# Patient Record
Sex: MALE | Race: WHITE | NOT HISPANIC OR LATINO | URBAN - METROPOLITAN AREA
[De-identification: names, ages, dates, MRNs, and addresses within clinical notes are randomized per-mention and may not be internally consistent; named-entity substitution may affect disease eponyms.]

---

## 2018-04-16 ENCOUNTER — OFFICE VISIT - HEALTHEAST (OUTPATIENT)
Dept: FAMILY MEDICINE | Facility: CLINIC | Age: 18
End: 2018-04-16

## 2018-04-16 DIAGNOSIS — J01.90 ACUTE SINUSITIS: ICD-10-CM

## 2018-04-16 DIAGNOSIS — J02.9 SORE THROAT: ICD-10-CM

## 2018-04-16 RX ORDER — LORATADINE 10 MG/1
10 TABLET ORAL DAILY
Qty: 30 TABLET | Refills: 2 | Status: SHIPPED | OUTPATIENT
Start: 2018-04-16

## 2018-04-16 RX ORDER — OXYMETAZOLINE HYDROCHLORIDE 0.05 G/100ML
SPRAY NASAL
Refills: 0 | Status: SHIPPED | COMMUNITY
Start: 2018-04-16

## 2018-04-16 RX ORDER — PSEUDOEPHEDRINE HCL 30 MG
30 TABLET ORAL EVERY 6 HOURS PRN
Qty: 24 TABLET | Refills: 2 | Status: SHIPPED | OUTPATIENT
Start: 2018-04-16

## 2019-10-17 ENCOUNTER — OFFICE VISIT - HEALTHEAST (OUTPATIENT)
Dept: FAMILY MEDICINE | Facility: CLINIC | Age: 19
End: 2019-10-17

## 2019-10-17 DIAGNOSIS — H65.93 FLUID LEVEL BEHIND TYMPANIC MEMBRANE OF BOTH EARS: ICD-10-CM

## 2019-10-17 ASSESSMENT — MIFFLIN-ST. JEOR: SCORE: 1823.96

## 2021-06-01 VITALS — WEIGHT: 181.5 LBS | BODY MASS INDEX: 26.61 KG/M2
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
## 2021-06-03 VITALS
OXYGEN SATURATION: 98 % | WEIGHT: 184 LBS | HEART RATE: 53 BPM | BODY MASS INDEX: 27.25 KG/M2 | DIASTOLIC BLOOD PRESSURE: 71 MMHG | HEIGHT: 69 IN | SYSTOLIC BLOOD PRESSURE: 105 MMHG

## 2021-06-17 NOTE — PROGRESS NOTES
Assessment:     1. Acute sinusitis  sodium chloride 0.65 % Drop    oxymetazoline (AFRIN, OXYMETAZOLINE,) 0.05 % nasal spray    fluticasone (FLONASE) 50 mcg/actuation nasal spray    loratadine (CLARITIN) 10 mg tablet    pseudoephedrine (SUDAFED) 30 MG tablet        Acute non-bacterial sinusitis.      Plan:      Nasal saline sprays.  Nasal steroids per medication orders.  Antihistamines per medication orders.  Follow up in a few days or as needed.        Printed education material is provided to the patient.        Subjective:       Esteban Rubi is a 17 y.o. male who presents for evaluation of sinus pain. Symptoms include: congestion, facial pain, mouth breathing, nasal congestion, sinus pressure and sore throat. Onset of symptoms was 3 days ago. Symptoms have been waxing and waning since that time. Past history is significant for no history of pneumonia or bronchitis. Patient is a Marijuana about 2 x week..  Feels feverish.  He works as a  and was working in the snowy weather this weekend.    No vomiting.  Feels nauseated.  Has been coughing yellow stuff.  He has been using Mucinex with little benefit.    The following portions of the patient's history were reviewed and updated as appropriate: allergies, current medications, past family history, past medical history, past social history, past surgical history and problem list.  No Known Allergies    No current outpatient prescriptions on file prior to visit.     No current facility-administered medications on file prior to visit.        Patient Active Problem List   Diagnosis     Streptococcal Sore Throat     Acne     Sore Throat       History reviewed. No pertinent past medical history.    History reviewed. No pertinent surgical history.    Family History   Problem Relation Age of Onset     No Medical Problems Mother      No Medical Problems Father        Social History     Social History     Marital status: Single     Spouse name: N/A     Number of  children: N/A     Years of education: N/A     Social History Main Topics     Smoking status: Never Smoker     Smokeless tobacco: Never Used     Alcohol use None     Drug use: None     Sexual activity: Not Asked     Other Topics Concern     None     Social History Narrative    He lives with his parents and sometimes at his girlfriend's residence.  There is a pet cat.  He works as a .  Does not smoke cigarettes but uses marijuana twice a week.        Denita Ramirez MD  4/16/2018               Review of Systems  Eyes: negative  Integument/breast: negative  Hematologic/lymphatic: negative     Objective:     /66 (Patient Site: Left Arm, Patient Position: Sitting, Cuff Size: Adult Large)  Pulse 64  Temp 98.5  F (36.9  C) (Oral)   Resp 16  Wt 181 lb 8 oz (82.3 kg)      General:Healthy, alert and in no acute distress  Head:  NCAT w/o lesions or tenderness  Eyes: conjunctivae/corneas clear. PERRL, EOM's intact. Fundi benign  Ears: normal TM's and external ear canals bilateral  Sinus tender: negative  Nose: Enlarged and erythematous turbinates  Mouth: lips, mucosa, and tongue normal. Teeth and gums normal. No tonsillar endangerment , Mild erythema of pharynx  Neck: supple, symmetrical, trachea midline.  Lungs: clear to auscultation bilaterally  Heart: RRR, No murmurs  Abdomen: Soft NTND, No HSM, No peritoneal signs, Rebound negative, Mc Post's sign negative, No CVA tenderness.  Skin: No rashes

## 2021-06-17 NOTE — PROGRESS NOTES
Assessment:   The encounter diagnosis was Sore throat.     Plan:   No medications were ordered this encounter    Patient Instructions     You may want to try warm salt water gargles or rinses to feel better or help prevent another bout in the future. Mix 1 teaspoon of salt in 8 ounces of water, gargle, and spit. Do this several times a day for several days. Do not swallow the mixture.  Based on the information provided, I would recommend you come in for an appointment to discuss these symptoms. Please schedule an appointment.    You will not be charged for this eVisit.    Return for further follow up if needed. Call [phone] or schedule an appointment via [employer]..    Subjective:   Esteban Rubi is a 17 y.o. male who submitted an eVisit request for evaluation of his Sinus Problem.  See the questionnaire and message section of encounter report for information related to history of present illness and review of systems.    The following portions of the patient's history were reviewed and updated as appropriate:  He  does not have any pertinent problems on file.  He has No Known Allergies..     Objective:   No exam performed today, patient submitted as eVisit.

## 2021-08-21 ENCOUNTER — HEALTH MAINTENANCE LETTER (OUTPATIENT)
Age: 21
End: 2021-08-21

## 2021-10-16 ENCOUNTER — HEALTH MAINTENANCE LETTER (OUTPATIENT)
Age: 21
End: 2021-10-16

## 2022-10-01 ENCOUNTER — HEALTH MAINTENANCE LETTER (OUTPATIENT)
Age: 22
End: 2022-10-01

## 2023-10-15 ENCOUNTER — HEALTH MAINTENANCE LETTER (OUTPATIENT)
Age: 23
End: 2023-10-15